# Patient Record
Sex: FEMALE | NOT HISPANIC OR LATINO | Employment: FULL TIME | URBAN - METROPOLITAN AREA
[De-identification: names, ages, dates, MRNs, and addresses within clinical notes are randomized per-mention and may not be internally consistent; named-entity substitution may affect disease eponyms.]

---

## 2023-02-23 LAB
FLU A RESULT: NOT DETECTED
FLU B RESULT: NOT DETECTED
GROUP A STREP, PCR: NOT DETECTED
SARS-COV-2 RESULT: NOT DETECTED

## 2023-11-29 ENCOUNTER — OFFICE VISIT (OUTPATIENT)
Dept: OBSTETRICS AND GYNECOLOGY | Facility: CLINIC | Age: 38
End: 2023-11-29
Payer: COMMERCIAL

## 2023-11-29 VITALS
HEIGHT: 68 IN | BODY MASS INDEX: 25.91 KG/M2 | DIASTOLIC BLOOD PRESSURE: 70 MMHG | SYSTOLIC BLOOD PRESSURE: 104 MMHG | WEIGHT: 171 LBS

## 2023-11-29 DIAGNOSIS — Z01.419 WELL WOMAN EXAM: Primary | ICD-10-CM

## 2023-11-29 DIAGNOSIS — Z12.4 CERVICAL CANCER SCREENING: ICD-10-CM

## 2023-11-29 PROCEDURE — 88175 CYTOPATH C/V AUTO FLUID REDO: CPT

## 2023-11-29 PROCEDURE — 1036F TOBACCO NON-USER: CPT | Performed by: OBSTETRICS & GYNECOLOGY

## 2023-11-29 PROCEDURE — 87624 HPV HI-RISK TYP POOLED RSLT: CPT

## 2023-11-29 PROCEDURE — 99395 PREV VISIT EST AGE 18-39: CPT | Performed by: OBSTETRICS & GYNECOLOGY

## 2023-11-29 ASSESSMENT — ENCOUNTER SYMPTOMS
ABDOMINAL DISTENTION: 0
ABDOMINAL PAIN: 0
SHORTNESS OF BREATH: 0
SLEEP DISTURBANCE: 0
BLOOD IN STOOL: 0
UNEXPECTED WEIGHT CHANGE: 0
DYSURIA: 0
APPETITE CHANGE: 0
CONSTIPATION: 0
DIARRHEA: 0
CHILLS: 0
VOMITING: 0
FLANK PAIN: 0
NAUSEA: 0
BACK PAIN: 0
HEMATURIA: 0
COLOR CHANGE: 0
FREQUENCY: 0
FATIGUE: 0
FEVER: 0

## 2023-11-29 ASSESSMENT — PAIN SCALES - GENERAL: PAINLEVEL: 0-NO PAIN

## 2023-11-29 NOTE — PROGRESS NOTES
"Ursula Mays is a 38 y.o.  here for well woman exam.      Concerns: none ; did have some PMS type symptoms which seem to have resolved  Works for [employer] in [place].   Has 1 yo son.     GynHx:  Cycles: regular, no concerns   Sexually active: Yes with one male partner/   Contraception: NFP  Patient's last menstrual period was 2023.       OB History          1    Para   1    Term   1            AB        Living   1         SAB        IAB        Ectopic        Multiple        Live Births   1               Past med hx and past surg hx reviewed and notable for: no new issues    Objective   /70   Ht 1.727 m (5' 8\")   Wt 77.6 kg (171 lb)   LMP 2023   BMI 26.00 kg/m²     Review of Systems   Constitutional:  Negative for appetite change, chills, fatigue, fever and unexpected weight change.   Respiratory:  Negative for shortness of breath.    Cardiovascular:  Negative for chest pain.   Gastrointestinal:  Negative for abdominal distention, abdominal pain, blood in stool, constipation, diarrhea, nausea and vomiting.   Endocrine: Negative for cold intolerance and heat intolerance.   Genitourinary:  Negative for dyspareunia, dysuria, flank pain, frequency, genital sores, hematuria, menstrual problem, pelvic pain, urgency, vaginal bleeding, vaginal discharge and vaginal pain.   Musculoskeletal:  Negative for back pain.   Skin:  Negative for color change.   Psychiatric/Behavioral:  Negative for sleep disturbance.        Physical Exam  Constitutional:       Appearance: Normal appearance.   HENT:      Head: Normocephalic and atraumatic.   Chest:   Breasts:     Right: Normal.      Left: Normal.   Abdominal:      General: Abdomen is flat.      Palpations: Abdomen is soft.      Tenderness: There is no abdominal tenderness.   Genitourinary:     General: Normal vulva.      Vagina: Normal.      Cervix: Normal.      Uterus: Normal.       Adnexa: Right adnexa normal and left " adnexa normal.      Comments: Pap collected today   Skin:     General: Skin is warm and dry.   Neurological:      Mental Status: She is alert and oriented to person, place, and time.   Psychiatric:         Mood and Affect: Mood normal.          Assessment and Plan:  Routine Well Woman Exam Today.   Discussed diet and exercise and routine health screening.   Pap: pap done today   Mammograms starting at 39 yo or sooner if any concerns.     Pt unsure if they will TTC. Discussed risks of AMA and repeat CD.       No orders of the defined types were placed in this encounter.

## 2024-07-08 ENCOUNTER — APPOINTMENT (OUTPATIENT)
Dept: PRIMARY CARE | Facility: CLINIC | Age: 39
End: 2024-07-08
Payer: COMMERCIAL

## 2024-07-08 VITALS
HEIGHT: 68 IN | OXYGEN SATURATION: 98 % | RESPIRATION RATE: 16 BRPM | BODY MASS INDEX: 25.31 KG/M2 | WEIGHT: 167 LBS | DIASTOLIC BLOOD PRESSURE: 86 MMHG | SYSTOLIC BLOOD PRESSURE: 123 MMHG | TEMPERATURE: 98 F | HEART RATE: 100 BPM

## 2024-07-08 DIAGNOSIS — G89.29 CHRONIC NONINTRACTABLE HEADACHE, UNSPECIFIED HEADACHE TYPE: Primary | ICD-10-CM

## 2024-07-08 DIAGNOSIS — Z82.71 FAMILY HISTORY OF POLYCYSTIC KIDNEY: ICD-10-CM

## 2024-07-08 DIAGNOSIS — Z00.00 HEALTH CARE MAINTENANCE: ICD-10-CM

## 2024-07-08 DIAGNOSIS — Z82.49 FAMILY HISTORY OF AORTIC ANEURYSM: ICD-10-CM

## 2024-07-08 DIAGNOSIS — R51.9 CHRONIC NONINTRACTABLE HEADACHE, UNSPECIFIED HEADACHE TYPE: Primary | ICD-10-CM

## 2024-07-08 DIAGNOSIS — L98.9 LESION OF SKIN OF NOSE: ICD-10-CM

## 2024-07-08 PROCEDURE — 1036F TOBACCO NON-USER: CPT | Performed by: STUDENT IN AN ORGANIZED HEALTH CARE EDUCATION/TRAINING PROGRAM

## 2024-07-08 PROCEDURE — 99213 OFFICE O/P EST LOW 20 MIN: CPT | Performed by: STUDENT IN AN ORGANIZED HEALTH CARE EDUCATION/TRAINING PROGRAM

## 2024-07-08 ASSESSMENT — ENCOUNTER SYMPTOMS
DYSURIA: 0
WEAKNESS: 0
SPEECH DIFFICULTY: 0
WHEEZING: 0
DIZZINESS: 0
VOMITING: 0
FEVER: 0
NUMBNESS: 0
SHORTNESS OF BREATH: 0
HEMATURIA: 0
NAUSEA: 0
CONSTIPATION: 0
ACTIVITY CHANGE: 0
ABDOMINAL PAIN: 0
COUGH: 0

## 2024-07-08 NOTE — PROGRESS NOTES
"Subjective   Patient ID: Ursula Mays is a 39 y.o. female who presents for Establish Care.  HPI  Ursula Mays is 39 y.o. is here to establish care    Chronic active medical problems         Past surgeries C section   Allergies penicilline    T X  Alc social  Marijaun: X        Headache  Since 2015  More since her recent pregnancy 3-4 years ago  Does have blurred vision gasper in the binasal rgion  Nausea but no vomiting  Sensitve to light  Gets better by sleep   Last episode 1 month ago   No head trauma reported  No weakness/numbenss in face/arm or legs    Regular menstrual cycles; stopped breast feeding 6 months ago; therefore still has some production of milk     Immunizations  Tdap utd        No past medical history on file.   Past Surgical History:   Procedure Laterality Date    OTHER SURGICAL HISTORY  2022     section      Family History   Problem Relation Name Age of Onset    Aortic aneurysm Mother  32    Breast cancer Maternal Grandmother        Allergies   Allergen Reactions    Penicillin Rash          Occupation:     Review of Systems   Constitutional:  Negative for activity change and fever.   HENT:  Negative for congestion.    Respiratory:  Negative for cough, shortness of breath and wheezing.    Cardiovascular:  Negative for chest pain and leg swelling.   Gastrointestinal:  Negative for abdominal pain, constipation, nausea and vomiting.   Endocrine: Negative for cold intolerance.   Genitourinary:  Negative for dysuria, hematuria and urgency.   Neurological:  Negative for dizziness, speech difficulty, weakness and numbness.   Psychiatric/Behavioral:  Negative for self-injury and suicidal ideas.        Objective   Visit Vitals  /86   Pulse 100   Temp 36.7 °C (98 °F)   Resp 16   Ht 1.727 m (5' 8\")   Wt 75.8 kg (167 lb)   SpO2 98%   BMI 25.39 kg/m²   Smoking Status Former   BSA 1.91 m²      Physical Exam  Constitutional:       Appearance: Normal appearance.   HENT:      Head: Normocephalic " and atraumatic.      Nose: Nose normal.      Mouth/Throat:      Mouth: Mucous membranes are moist.   Eyes:      Conjunctiva/sclera: Conjunctivae normal.      Pupils: Pupils are equal, round, and reactive to light.   Cardiovascular:      Rate and Rhythm: Normal rate and regular rhythm.      Pulses: Normal pulses.      Heart sounds: Normal heart sounds.   Pulmonary:      Effort: Pulmonary effort is normal.      Breath sounds: Normal breath sounds.   Musculoskeletal:         General: Normal range of motion.      Cervical back: Neck supple.   Skin:     General: Skin is warm.   Neurological:      General: No focal deficit present.      Mental Status: She is alert and oriented to person, place, and time.      Cranial Nerves: No cranial nerve deficit.      Sensory: No sensory deficit.      Motor: No weakness.      Coordination: Coordination normal.      Gait: Gait normal.      Deep Tendon Reflexes: Reflexes normal.   Psychiatric:         Mood and Affect: Mood normal.         Behavior: Behavior normal.         Thought Content: Thought content normal.         Judgment: Judgment normal.         Assessment/Plan   Diagnoses and all orders for this visit:  Chronic nonintractable headache, unspecified headache type  Unsure of the cause at present; possibly migraines given the clinical history, however has family history of polycystic kidney; h/o aortic aneurysm rupture causing death in mother. She also present with bilateral nasal field vision changes during the headache. No headache now  Given the above, it is reasonable to get an MRA of the head to r/o cerebral aneurysms     Family history of aortic aneurysm  Echo cardiogram  BP on both UL : 114/78 RA; LA: 118/80  Family history of polycystic kidney  Ultrasound kidney and urinalysis    Skin lesion on bridge of nose: derm ref   Has allergies to penicillin: agreeable  get in office testing done soon       Patient to seek medical attention immediately / call 911  if has worsening  of symptoms  or develops alarm symptoms such as focal neurological deficit./ worseining of symptoms  as discussed. Verbalizes understanding   We will call the patient with abnormal labs if any and discuss necessary changes based on labs; otherwise patient to follow up in 1-2 m for next physical exam/ review        Problem List Items Addressed This Visit    None  Visit Diagnoses       Chronic nonintractable headache, unspecified headache type    -  Primary    Relevant Orders    MR angio head wo IV contrast    Family history of aortic aneurysm        Relevant Orders    Transthoracic Echo Complete    MR angio head wo IV contrast    Family history of polycystic kidney        Relevant Orders    Urinalysis with Reflex Microscopic    US renal complete    Transthoracic Echo Complete    MR angio head wo IV contrast    Health care maintenance        Relevant Orders    Hemoglobin A1C    Lipid Panel    Comprehensive Metabolic Panel    CBC    TSH with reflex to Free T4 if abnormal    Lesion of skin of nose        Relevant Orders    Referral to Dermatology